# Patient Record
Sex: FEMALE | Race: BLACK OR AFRICAN AMERICAN | Employment: UNEMPLOYED | URBAN - METROPOLITAN AREA
[De-identification: names, ages, dates, MRNs, and addresses within clinical notes are randomized per-mention and may not be internally consistent; named-entity substitution may affect disease eponyms.]

---
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
## 2025-04-28 ENCOUNTER — OFFICE VISIT (OUTPATIENT)
Age: 20
End: 2025-04-28

## 2025-04-28 VITALS
DIASTOLIC BLOOD PRESSURE: 70 MMHG | HEIGHT: 63 IN | BODY MASS INDEX: 23.04 KG/M2 | OXYGEN SATURATION: 98 % | HEART RATE: 100 BPM | WEIGHT: 130 LBS | SYSTOLIC BLOOD PRESSURE: 110 MMHG

## 2025-04-28 DIAGNOSIS — L73.2 HIDRADENITIS SUPPURATIVA OF LEFT AXILLA: Primary | ICD-10-CM

## 2025-04-28 RX ORDER — DOXYCYCLINE HYCLATE 100 MG
100 TABLET ORAL 2 TIMES DAILY
Qty: 20 TABLET | Refills: 0 | Status: SHIPPED | OUTPATIENT
Start: 2025-04-28 | End: 2025-05-08

## 2025-04-28 ASSESSMENT — ENCOUNTER SYMPTOMS
ROS SKIN COMMENTS: LEFT AXILLARY ABSCESS
VOMITING: 0
NAUSEA: 0
SHORTNESS OF BREATH: 0
ABDOMINAL PAIN: 0
WHEEZING: 0
COUGH: 0
BLOOD IN STOOL: 0
DIARRHEA: 0
CONSTIPATION: 0

## 2025-04-29 NOTE — PROGRESS NOTES
Moy Ortega (: 2005) is a 19 y.o. female, New patient, here for evaluation of the following       Chief complaint(s): bump underarm         HPI  19 year old female presents with c/o \"bump under my arm.\" This was first noticed a few days ago. Pain has worsened. Exacerbated by closing armpit or palpation. Denies fever, CP, cough, SOB, N/V/D. Denies pruritus. Has not tried anything for symptoms.       History provided by:  Patient   used: No           PAST MEDICAL HISTORY    Past Medical History:   Diagnosis Date    Anemia 2023    Anemia affecting pregnancy 12/15/2023    S/p 3 iron infusions    Dysmenorrhea 2024    Gestational hypertension affecting first pregnancy 8/15/2024       SURGICAL HISTORY    History reviewed. No pertinent surgical history.    CURRENT MEDICATIONS    Current Outpatient Rx   Medication Sig Dispense Refill    doxycycline hyclate (VIBRA-TABS) 100 MG tablet Take 1 tablet by mouth 2 times daily for 10 days 20 tablet 0    ibuprofen (ADVIL;MOTRIN) 800 MG tablet Take 1 tablet by mouth 3 times daily as needed for Pain 90 tablet 0    docusate sodium (COLACE) 100 MG capsule Take 1 capsule by mouth 2 times daily as needed for Constipation 60 capsule 0    ferrous sulfate (IRON 325) 325 (65 Fe) MG tablet Take 1 tablet by mouth 2 times daily 180 tablet 3       ALLERGIES    Allergies   Allergen Reactions    Latex Hives and Other (See Comments)     Facial swelling        FAMILY HISTORY    Family History   Problem Relation Age of Onset    Anemia Mother         on iron    Mult Sclerosis Mother     Mental Illness Father     No Known Problems Sister     No Known Problems Brother     No Known Problems Maternal Grandmother     No Known Problems Maternal Grandfather     No Known Problems Paternal Grandmother     No Known Problems Paternal Grandfather     No Known Problems Half-Sister     No Known Problems Half-Brother        SOCIAL HISTORY    Social History     Socioeconomic